# Patient Record
Sex: MALE | Race: WHITE | NOT HISPANIC OR LATINO | Employment: FULL TIME | URBAN - METROPOLITAN AREA
[De-identification: names, ages, dates, MRNs, and addresses within clinical notes are randomized per-mention and may not be internally consistent; named-entity substitution may affect disease eponyms.]

---

## 2017-09-09 ENCOUNTER — HOSPITAL ENCOUNTER (EMERGENCY)
Facility: HOSPITAL | Age: 32
Discharge: LEFT BEFORE DISCHARGE | End: 2017-09-09

## 2017-09-09 VITALS
BODY MASS INDEX: 23.03 KG/M2 | OXYGEN SATURATION: 99 % | RESPIRATION RATE: 18 BRPM | HEART RATE: 83 BPM | HEIGHT: 72 IN | TEMPERATURE: 97 F | SYSTOLIC BLOOD PRESSURE: 124 MMHG | DIASTOLIC BLOOD PRESSURE: 74 MMHG | WEIGHT: 170 LBS

## 2017-09-09 LAB
ALBUMIN SERPL-MCNC: 5 G/DL (ref 3.5–5.2)
ALBUMIN/GLOB SERPL: 1.9 G/DL
ALP SERPL-CCNC: 52 U/L (ref 39–117)
ALT SERPL W P-5'-P-CCNC: 91 U/L (ref 1–41)
ANION GAP SERPL CALCULATED.3IONS-SCNC: 12.7 MMOL/L
AST SERPL-CCNC: 114 U/L (ref 1–40)
BASOPHILS # BLD AUTO: 0.02 10*3/MM3 (ref 0–0.2)
BASOPHILS NFR BLD AUTO: 0.4 % (ref 0–1.5)
BILIRUB SERPL-MCNC: 0.4 MG/DL (ref 0.1–1.2)
BUN BLD-MCNC: 13 MG/DL (ref 6–20)
BUN/CREAT SERPL: 18.6 (ref 7–25)
CALCIUM SPEC-SCNC: 9.2 MG/DL (ref 8.6–10.5)
CHLORIDE SERPL-SCNC: 103 MMOL/L (ref 98–107)
CO2 SERPL-SCNC: 27.3 MMOL/L (ref 22–29)
CREAT BLD-MCNC: 0.7 MG/DL (ref 0.76–1.27)
DEPRECATED RDW RBC AUTO: 42.4 FL (ref 37–54)
EOSINOPHIL # BLD AUTO: 0.03 10*3/MM3 (ref 0–0.7)
EOSINOPHIL NFR BLD AUTO: 0.6 % (ref 0.3–6.2)
ERYTHROCYTE [DISTWIDTH] IN BLOOD BY AUTOMATED COUNT: 12.4 % (ref 11.5–14.5)
GFR SERPL CREATININE-BSD FRML MDRD: 130 ML/MIN/1.73
GLOBULIN UR ELPH-MCNC: 2.6 GM/DL
GLUCOSE BLD-MCNC: 86 MG/DL (ref 65–99)
HCT VFR BLD AUTO: 43.1 % (ref 40.4–52.2)
HGB BLD-MCNC: 14.8 G/DL (ref 13.7–17.6)
HOLD SPECIMEN: NORMAL
HOLD SPECIMEN: NORMAL
IMM GRANULOCYTES # BLD: 0 10*3/MM3 (ref 0–0.03)
IMM GRANULOCYTES NFR BLD: 0 % (ref 0–0.5)
LIPASE SERPL-CCNC: 35 U/L (ref 13–60)
LYMPHOCYTES # BLD AUTO: 1.32 10*3/MM3 (ref 0.9–4.8)
LYMPHOCYTES NFR BLD AUTO: 27.2 % (ref 19.6–45.3)
MCH RBC QN AUTO: 32.2 PG (ref 27–32.7)
MCHC RBC AUTO-ENTMCNC: 34.3 G/DL (ref 32.6–36.4)
MCV RBC AUTO: 93.7 FL (ref 79.8–96.2)
MONOCYTES # BLD AUTO: 0.29 10*3/MM3 (ref 0.2–1.2)
MONOCYTES NFR BLD AUTO: 6 % (ref 5–12)
NEUTROPHILS # BLD AUTO: 3.19 10*3/MM3 (ref 1.9–8.1)
NEUTROPHILS NFR BLD AUTO: 65.8 % (ref 42.7–76)
PLATELET # BLD AUTO: 286 10*3/MM3 (ref 140–500)
PMV BLD AUTO: 10.6 FL (ref 6–12)
POTASSIUM BLD-SCNC: 4.3 MMOL/L (ref 3.5–5.2)
PROT SERPL-MCNC: 7.6 G/DL (ref 6–8.5)
RBC # BLD AUTO: 4.6 10*6/MM3 (ref 4.6–6)
SODIUM BLD-SCNC: 143 MMOL/L (ref 136–145)
WBC NRBC COR # BLD: 4.85 10*3/MM3 (ref 4.5–10.7)
WHOLE BLOOD HOLD SPECIMEN: NORMAL
WHOLE BLOOD HOLD SPECIMEN: NORMAL

## 2017-09-09 PROCEDURE — 83690 ASSAY OF LIPASE: CPT

## 2017-09-09 PROCEDURE — 85025 COMPLETE CBC W/AUTO DIFF WBC: CPT

## 2017-09-09 PROCEDURE — 99211 OFF/OP EST MAY X REQ PHY/QHP: CPT

## 2017-09-09 PROCEDURE — 80053 COMPREHEN METABOLIC PANEL: CPT

## 2017-09-09 RX ORDER — SODIUM CHLORIDE 0.9 % (FLUSH) 0.9 %
10 SYRINGE (ML) INJECTION AS NEEDED
Status: DISCONTINUED | OUTPATIENT
Start: 2017-09-09 | End: 2017-09-09 | Stop reason: HOSPADM

## 2017-09-20 ENCOUNTER — OFFICE VISIT (OUTPATIENT)
Dept: GASTROENTEROLOGY | Facility: CLINIC | Age: 32
End: 2017-09-20

## 2017-09-20 VITALS
BODY MASS INDEX: 24.33 KG/M2 | SYSTOLIC BLOOD PRESSURE: 126 MMHG | WEIGHT: 179.6 LBS | HEIGHT: 72 IN | DIASTOLIC BLOOD PRESSURE: 70 MMHG

## 2017-09-20 DIAGNOSIS — R10.10 PAIN OF UPPER ABDOMEN: Primary | ICD-10-CM

## 2017-09-20 PROCEDURE — 99203 OFFICE O/P NEW LOW 30 MIN: CPT | Performed by: INTERNAL MEDICINE

## 2017-09-20 RX ORDER — OMEPRAZOLE 20 MG/1
20 CAPSULE, DELAYED RELEASE ORAL DAILY
Qty: 30 CAPSULE | Refills: 5 | Status: SHIPPED | OUTPATIENT
Start: 2017-09-20 | End: 2017-10-05

## 2017-09-20 NOTE — PROGRESS NOTES
PATIENT INFORMATION  Alfredo Connor       - 1985    CHIEF COMPLAINT  Chief Complaint   Patient presents with   • Abdominal Pain       HISTORY OF PRESENT ILLNESS  Abdominal Pain     30 yo developed upper abdominal pain, epigastric, ruq , sharp, knawing pain with some nausea but no vomiting. This occurred about 2 weeks ago.   He had labs done but did not stay for the visit due to the length of stay.  He has tried ppi in the past but this does not seem to help. He did not feel like this was heartburn related.  He drinks about 1 cup coffee daily, occ soda. No nsaid use.  Pain lasted for about 3 hours and went away on its own. Labs including lfts were done.    ALT (SGPT) 1 - 41 U/L 91 (H)   AST (SGOT) 1 - 40 U/L 114 (H)   Alkaline Phosphatase 39 - 117 U/L 52     He drinks etoh and had 8-10 beers prior to the above visit. He did not think this made pain worse. Lipase was normal.  No nausea, vomiting or melena. No hematemesis or hematochezia. Weight has been stable.  He denies any heartburn, dysphagia or odynophagia.  He has been having milder but similar episodes as above, for the past 2 years.   He is not able to pinpoint any foods or etoh to episodes.  He is on tofacitinib for alopecia for the past 2 months and is followed by dermatologist.  REVIEW OF SYSTEMS  Review of Systems   Gastrointestinal: Positive for abdominal pain.   All other systems reviewed and are negative.        ACTIVE PROBLEMS  There are no active problems to display for this patient.        PAST MEDICAL HISTORY  Past Medical History:   Diagnosis Date   • Alopecia          SURGICAL HISTORY  No past surgical history on file.      FAMILY HISTORY  No family history on file.      SOCIAL HISTORY  Social History     Occupational History   • Not on file.     Social History Main Topics   • Smoking status: Current Every Day Smoker     Types: Electronic Cigarette   • Smokeless tobacco: Never Used   • Alcohol use Yes   • Drug use: No   • Sexual activity:  "Defer         CURRENT MEDICATIONS    Current Outpatient Prescriptions:   •  omeprazole (priLOSEC) 20 MG capsule, Take 1 capsule by mouth Daily., Disp: 30 capsule, Rfl: 5  •  Tofacitinib Citrate (XELJANZ) 5 MG tablet, Take 5 mg by mouth 2 (Two) Times a Day., Disp: , Rfl:     ALLERGIES  Review of patient's allergies indicates no known allergies.    VITALS  Vitals:    09/20/17 1434   BP: 126/70   Weight: 179 lb 9.6 oz (81.5 kg)   Height: 72\" (182.9 cm)       LAST RESULTS   Admission on 09/09/2017, Discharged on 09/09/2017   Component Date Value Ref Range Status   • Glucose 09/09/2017 86  65 - 99 mg/dL Final   • BUN 09/09/2017 13  6 - 20 mg/dL Final   • Creatinine 09/09/2017 0.70* 0.76 - 1.27 mg/dL Final   • Sodium 09/09/2017 143  136 - 145 mmol/L Final   • Potassium 09/09/2017 4.3  3.5 - 5.2 mmol/L Final   • Chloride 09/09/2017 103  98 - 107 mmol/L Final   • CO2 09/09/2017 27.3  22.0 - 29.0 mmol/L Final   • Calcium 09/09/2017 9.2  8.6 - 10.5 mg/dL Final   • Total Protein 09/09/2017 7.6  6.0 - 8.5 g/dL Final   • Albumin 09/09/2017 5.00  3.50 - 5.20 g/dL Final   • ALT (SGPT) 09/09/2017 91* 1 - 41 U/L Final   • AST (SGOT) 09/09/2017 114* 1 - 40 U/L Final   • Alkaline Phosphatase 09/09/2017 52  39 - 117 U/L Final   • Total Bilirubin 09/09/2017 0.4  0.1 - 1.2 mg/dL Final   • eGFR Non African Amer 09/09/2017 130  >60 mL/min/1.73 Final   • Globulin 09/09/2017 2.6  gm/dL Final   • A/G Ratio 09/09/2017 1.9  g/dL Final   • BUN/Creatinine Ratio 09/09/2017 18.6  7.0 - 25.0 Final   • Anion Gap 09/09/2017 12.7  mmol/L Final   • Lipase 09/09/2017 35  13 - 60 U/L Final   • Extra Tube 09/09/2017 hold for add-on   Final    Auto resulted   • Extra Tube 09/09/2017 Hold for add-ons.   Final    Auto resulted.   • Extra Tube 09/09/2017 hold for add-on   Final    Auto resulted   • Extra Tube 09/09/2017 Hold for add-ons.   Final    Auto resulted.   • WBC 09/09/2017 4.85  4.50 - 10.70 10*3/mm3 Final   • RBC 09/09/2017 4.60  4.60 - 6.00 10*6/mm3 " Final   • Hemoglobin 09/09/2017 14.8  13.7 - 17.6 g/dL Final   • Hematocrit 09/09/2017 43.1  40.4 - 52.2 % Final   • MCV 09/09/2017 93.7  79.8 - 96.2 fL Final   • MCH 09/09/2017 32.2  27.0 - 32.7 pg Final   • MCHC 09/09/2017 34.3  32.6 - 36.4 g/dL Final   • RDW 09/09/2017 12.4  11.5 - 14.5 % Final   • RDW-SD 09/09/2017 42.4  37.0 - 54.0 fl Final   • MPV 09/09/2017 10.6  6.0 - 12.0 fL Final   • Platelets 09/09/2017 286  140 - 500 10*3/mm3 Final   • Neutrophil % 09/09/2017 65.8  42.7 - 76.0 % Final   • Lymphocyte % 09/09/2017 27.2  19.6 - 45.3 % Final   • Monocyte % 09/09/2017 6.0  5.0 - 12.0 % Final   • Eosinophil % 09/09/2017 0.6  0.3 - 6.2 % Final   • Basophil % 09/09/2017 0.4  0.0 - 1.5 % Final   • Immature Grans % 09/09/2017 0.0  0.0 - 0.5 % Final   • Neutrophils, Absolute 09/09/2017 3.19  1.90 - 8.10 10*3/mm3 Final   • Lymphocytes, Absolute 09/09/2017 1.32  0.90 - 4.80 10*3/mm3 Final   • Monocytes, Absolute 09/09/2017 0.29  0.20 - 1.20 10*3/mm3 Final   • Eosinophils, Absolute 09/09/2017 0.03  0.00 - 0.70 10*3/mm3 Final   • Basophils, Absolute 09/09/2017 0.02  0.00 - 0.20 10*3/mm3 Final   • Immature Grans, Absolute 09/09/2017 0.00  0.00 - 0.03 10*3/mm3 Final     No results found.    PHYSICAL EXAM  Physical Exam   Constitutional: He is oriented to person, place, and time. He appears well-developed and well-nourished. No distress.   HENT:   Head: Normocephalic and atraumatic.   Eyes: EOM are normal. Pupils are equal, round, and reactive to light.   Neck: Neck supple. No tracheal deviation present.   Cardiovascular: Normal rate, regular rhythm, normal heart sounds and intact distal pulses.  Exam reveals no gallop and no friction rub.    No murmur heard.  Pulmonary/Chest: Effort normal and breath sounds normal. No respiratory distress. He has no wheezes. He has no rales. He exhibits no tenderness.   Abdominal: Soft. Bowel sounds are normal. He exhibits no distension. There is no tenderness. There is no rebound and no  guarding.   Musculoskeletal: He exhibits no edema.   Lymphadenopathy:     He has no cervical adenopathy.   Neurological: He is alert and oriented to person, place, and time.   Skin: Skin is warm. He is not diaphoretic. No erythema.   Psychiatric: He has a normal mood and affect. His behavior is normal. Judgment and thought content normal.   Nursing note and vitals reviewed.      ASSESSMENT  Diagnoses and all orders for this visit:    Pain of upper abdomen  -     Hepatic Function Panel  -     Hepatitis Panel, Acute  -     US Gallbladder; Future    Other orders  -     omeprazole (priLOSEC) 20 MG capsule; Take 1 capsule by mouth Daily.          PLAN  No Follow-up on file.    Antireflux measures and dietary modifications reviewed. Low acid diet reviewed. Keep head of bed elevated. Stop eating/drinking at least 3 hours prior to bedtime. Eliminate caffeine and carbonated beverages.  Weight loss encouraged if BMI over 25.        Risks, benefits and alternatives discussed including but not limited to the complications of bleeding, perforation and sedation related problems.

## 2017-09-25 ENCOUNTER — APPOINTMENT (OUTPATIENT)
Dept: LAB | Facility: HOSPITAL | Age: 32
End: 2017-09-25

## 2017-09-25 ENCOUNTER — HOSPITAL ENCOUNTER (OUTPATIENT)
Dept: ULTRASOUND IMAGING | Facility: HOSPITAL | Age: 32
Discharge: HOME | End: 2017-09-25
Attending: INTERNAL MEDICINE | Admitting: INTERNAL MEDICINE

## 2017-09-25 DIAGNOSIS — R10.10 PAIN OF UPPER ABDOMEN: ICD-10-CM

## 2017-09-25 DIAGNOSIS — K86.89 PANCREATIC MASS: Primary | ICD-10-CM

## 2017-09-25 LAB
ALBUMIN SERPL-MCNC: 4.6 G/DL (ref 3.5–5.2)
ALP SERPL-CCNC: 44 U/L (ref 39–117)
ALT SERPL W P-5'-P-CCNC: 20 U/L (ref 1–41)
AST SERPL-CCNC: 14 U/L (ref 1–40)
BILIRUB CONJ SERPL-MCNC: <0.2 MG/DL (ref 0–0.3)
BILIRUB INDIRECT SERPL-MCNC: NORMAL MG/DL
BILIRUB SERPL-MCNC: 0.5 MG/DL (ref 0.1–1.2)
HAV IGM SERPL QL IA: NORMAL
HBV CORE IGM SERPL QL IA: NORMAL
HBV SURFACE AG SERPL QL IA: NORMAL
HCV AB SER DONR QL: NORMAL
PROT SERPL-MCNC: 6.4 G/DL (ref 6–8.5)

## 2017-09-25 PROCEDURE — 76705 ECHO EXAM OF ABDOMEN: CPT

## 2017-09-25 PROCEDURE — 80076 HEPATIC FUNCTION PANEL: CPT | Performed by: INTERNAL MEDICINE

## 2017-09-25 PROCEDURE — 80074 ACUTE HEPATITIS PANEL: CPT | Performed by: INTERNAL MEDICINE

## 2017-09-25 PROCEDURE — 36415 COLL VENOUS BLD VENIPUNCTURE: CPT | Performed by: INTERNAL MEDICINE

## 2017-09-29 ENCOUNTER — HOSPITAL ENCOUNTER (OUTPATIENT)
Dept: CT IMAGING | Facility: HOSPITAL | Age: 32
Discharge: HOME | End: 2017-09-29
Attending: INTERNAL MEDICINE | Admitting: INTERNAL MEDICINE

## 2017-09-29 DIAGNOSIS — K86.89 PANCREATIC MASS: ICD-10-CM

## 2017-09-29 PROCEDURE — 0 IOPAMIDOL 61 % SOLUTION: Performed by: INTERNAL MEDICINE

## 2017-09-29 PROCEDURE — 74177 CT ABD & PELVIS W/CONTRAST: CPT

## 2017-09-29 PROCEDURE — 0 DIATRIZOATE MEGLUMINE & SODIUM PER 1 ML: Performed by: INTERNAL MEDICINE

## 2017-09-29 RX ADMIN — IOPAMIDOL 85 ML: 612 INJECTION, SOLUTION INTRAVENOUS at 10:48

## 2017-09-29 RX ADMIN — DIATRIZOATE MEGLUMINE AND DIATRIZOATE SODIUM 30 ML: 660; 100 LIQUID ORAL; RECTAL at 09:45

## 2017-10-05 ENCOUNTER — OFFICE VISIT (OUTPATIENT)
Dept: FAMILY MEDICINE CLINIC | Facility: CLINIC | Age: 32
End: 2017-10-05

## 2017-10-05 VITALS
OXYGEN SATURATION: 99 % | HEIGHT: 72 IN | TEMPERATURE: 98.3 F | DIASTOLIC BLOOD PRESSURE: 68 MMHG | BODY MASS INDEX: 24.52 KG/M2 | SYSTOLIC BLOOD PRESSURE: 120 MMHG | WEIGHT: 181 LBS | HEART RATE: 67 BPM

## 2017-10-05 DIAGNOSIS — R10.13 EPIGASTRIC PAIN: Primary | ICD-10-CM

## 2017-10-05 PROCEDURE — 99203 OFFICE O/P NEW LOW 30 MIN: CPT | Performed by: INTERNAL MEDICINE

## 2017-10-05 NOTE — PROGRESS NOTES
Usman Connor is a 31 y.o. male. Patient is here today for establishing care as a new patient and because of some epigastric pain.  The patient reports that he was at the emergency room on September 19 because of severe epigastric abdominal pain.  He said he has had this about once every 6 months.  Since then he was referred to a gastroenterologist, Dr. Soila Price.  He has had abdominal ultrasound, CT scan and is scheduled for EGD.  He does have a prescription for omeprazole to use if needed.  Currently he is pain-free and generally is quite healthy.  PMH-he is being treated by Dr. Wilkins, dermatologist for alopecia with Xeljanz.  He has had Lasix surgery and a tonsillectomy.  SH-patient's  and he is a former smoker and does have about 10 alcoholic drinks a month.    Chief Complaint   Patient presents with   • Abdominal Pain     PT WENT TO ER IN SEPT FOR ABDOMINAL PAIN- SAW GASTRO AND IS HERE TO ESTABLISH          Vitals:    10/05/17 1309   BP: 120/68   Pulse: 67   Temp: 98.3 °F (36.8 °C)   SpO2: 99%     The following portions of the patient's history were reviewed and updated as appropriate: allergies, current medications, past family history, past medical history, past social history, past surgical history and problem list.    Past Medical History:   Diagnosis Date   • Alopecia       Allergies   Allergen Reactions   • Azithromycin      MADE SICK AS A KID   • Ceclor [Cefaclor]      MADE SICK AS A KID      Social History     Social History   • Marital status:      Spouse name: N/A   • Number of children: N/A   • Years of education: N/A     Occupational History   • Not on file.     Social History Main Topics   • Smoking status: Former Smoker     Types: Electronic Cigarette   • Smokeless tobacco: Never Used   • Alcohol use Yes   • Drug use: No   • Sexual activity: Defer     Other Topics Concern   • Not on file     Social History Narrative   • No narrative on file        Current Outpatient  Prescriptions:   •  Tofacitinib Citrate (XELJANZ) 5 MG tablet, Take 5 mg by mouth 2 (Two) Times a Day., Disp: , Rfl:      Objective     History of Present Illness     Review of Systems   Constitutional: Negative.  Negative for fever.   HENT: Negative.    Eyes: Negative.    Respiratory: Negative.    Cardiovascular: Negative.    Gastrointestinal: Positive for abdominal pain. Negative for constipation, diarrhea, nausea and vomiting.   Endocrine: Negative.    Genitourinary: Negative.  Negative for dysuria, frequency and hematuria.   Musculoskeletal: Negative.  Negative for arthralgias and myalgias.   Allergic/Immunologic: Negative.    Neurological: Negative.  Negative for headaches.   Psychiatric/Behavioral: Negative.        Physical Exam   Constitutional: He is oriented to person, place, and time. He appears well-developed and well-nourished.   Pleasant, cooperative in no distress   HENT:   Head: Normocephalic and atraumatic.   Eyes: Conjunctivae are normal. Pupils are equal, round, and reactive to light. No scleral icterus.   Neck: Normal range of motion. Neck supple. No thyromegaly present.   Cardiovascular: Normal rate, regular rhythm and normal heart sounds.    Pulmonary/Chest: Effort normal and breath sounds normal. No respiratory distress. He has no wheezes. He has no rales.   Abdominal: Soft. Bowel sounds are normal. There is no tenderness.   Musculoskeletal: Normal range of motion. He exhibits no edema.   Neurological: He is alert and oriented to person, place, and time.   Skin: Skin is warm and dry.   Psychiatric: He has a normal mood and affect. His behavior is normal.   Nursing note and vitals reviewed.      ASSESSMENT  the patient had elevated ALT and AST of 91 and 114 at the emergency room with a normal lipase on 9-9, 2017.  Subsequently on 9-25, 2017 he had a normal hepatic function panel and a negative acute hepatitis panel.  Ultrasound of the gallbladder showed no signs of gallbladder problems but there  was a questionable pancreatic lesion and he had a subsequent CT scan on 9-25, 2017 which showed a normal pancreas and was essentially normal.  He is scheduled for upper endoscopy.  He's having no further abdominal pain.     Problem List Items Addressed This Visit        Nervous and Auditory    Epigastric pain - Primary          PLAN  The patient will schedule an appointment in about one month with a CMP and lipid panel    There are no Patient Instructions on file for this visit.  Return in about 1 month (around 11/5/2017) for with labs.

## 2017-11-06 DIAGNOSIS — Z13.220 SCREENING FOR CHOLESTEROL LEVEL: Primary | ICD-10-CM

## 2017-11-14 ENCOUNTER — LAB REQUISITION (OUTPATIENT)
Dept: LAB | Facility: HOSPITAL | Age: 32
End: 2017-11-14

## 2017-11-14 ENCOUNTER — OUTSIDE FACILITY SERVICE (OUTPATIENT)
Dept: GASTROENTEROLOGY | Facility: CLINIC | Age: 32
End: 2017-11-14

## 2017-11-14 DIAGNOSIS — R10.10 UPPER ABDOMINAL PAIN: ICD-10-CM

## 2017-11-14 LAB
ALBUMIN SERPL-MCNC: 4.7 G/DL (ref 3.5–5.2)
ALBUMIN/GLOB SERPL: 2.6 G/DL
ALP SERPL-CCNC: 46 U/L (ref 39–117)
ALT SERPL-CCNC: 24 U/L (ref 1–41)
AST SERPL-CCNC: 16 U/L (ref 1–40)
BILIRUB SERPL-MCNC: 0.7 MG/DL (ref 0.1–1.2)
BUN SERPL-MCNC: 12 MG/DL (ref 6–20)
BUN/CREAT SERPL: 14 (ref 7–25)
CALCIUM SERPL-MCNC: 9.6 MG/DL (ref 8.6–10.5)
CHLORIDE SERPL-SCNC: 104 MMOL/L (ref 98–107)
CHOLEST SERPL-MCNC: 197 MG/DL (ref 0–200)
CO2 SERPL-SCNC: 27.2 MMOL/L (ref 22–29)
CREAT SERPL-MCNC: 0.86 MG/DL (ref 0.76–1.27)
GFR SERPLBLD CREATININE-BSD FMLA CKD-EPI: 102 ML/MIN/1.73
GFR SERPLBLD CREATININE-BSD FMLA CKD-EPI: 123 ML/MIN/1.73
GLOBULIN SER CALC-MCNC: 1.8 GM/DL
GLUCOSE SERPL-MCNC: 93 MG/DL (ref 65–99)
HDLC SERPL-MCNC: 56 MG/DL (ref 40–60)
LDLC SERPL CALC-MCNC: 130 MG/DL (ref 0–100)
LDLC/HDLC SERPL: 2.32 {RATIO}
POTASSIUM SERPL-SCNC: 4.7 MMOL/L (ref 3.5–5.2)
PROT SERPL-MCNC: 6.5 G/DL (ref 6–8.5)
SODIUM SERPL-SCNC: 142 MMOL/L (ref 136–145)
TRIGL SERPL-MCNC: 56 MG/DL (ref 0–150)
VLDLC SERPL CALC-MCNC: 11.2 MG/DL (ref 5–40)

## 2017-11-14 PROCEDURE — 88312 SPECIAL STAINS GROUP 1: CPT | Performed by: INTERNAL MEDICINE

## 2017-11-14 PROCEDURE — 88305 TISSUE EXAM BY PATHOLOGIST: CPT | Performed by: INTERNAL MEDICINE

## 2017-11-14 PROCEDURE — 43239 EGD BIOPSY SINGLE/MULTIPLE: CPT | Performed by: INTERNAL MEDICINE

## 2017-11-16 LAB
CYTO UR: NORMAL
LAB AP CASE REPORT: NORMAL
LAB AP CLINICAL INFORMATION: NORMAL
Lab: NORMAL
PATH REPORT.FINAL DX SPEC: NORMAL
PATH REPORT.GROSS SPEC: NORMAL

## 2017-11-21 PROBLEM — K21.00 GASTROESOPHAGEAL REFLUX DISEASE WITH ESOPHAGITIS: Status: ACTIVE | Noted: 2017-11-21

## 2017-11-21 RX ORDER — OMEPRAZOLE 20 MG/1
20 CAPSULE, DELAYED RELEASE ORAL DAILY
Qty: 90 CAPSULE | Refills: 3 | Status: SHIPPED | OUTPATIENT
Start: 2017-11-21

## 2017-12-01 ENCOUNTER — OFFICE VISIT (OUTPATIENT)
Dept: FAMILY MEDICINE CLINIC | Facility: CLINIC | Age: 32
End: 2017-12-01

## 2017-12-01 VITALS
HEIGHT: 72 IN | TEMPERATURE: 98.3 F | OXYGEN SATURATION: 98 % | DIASTOLIC BLOOD PRESSURE: 74 MMHG | HEART RATE: 61 BPM | SYSTOLIC BLOOD PRESSURE: 114 MMHG | BODY MASS INDEX: 25.03 KG/M2 | WEIGHT: 184.8 LBS

## 2017-12-01 DIAGNOSIS — E78.5 HYPERLIPIDEMIA, UNSPECIFIED HYPERLIPIDEMIA TYPE: ICD-10-CM

## 2017-12-01 DIAGNOSIS — R10.13 EPIGASTRIC PAIN: Primary | ICD-10-CM

## 2017-12-01 PROCEDURE — 99213 OFFICE O/P EST LOW 20 MIN: CPT | Performed by: INTERNAL MEDICINE

## 2017-12-01 NOTE — PROGRESS NOTES
Usman Connor is a 32 y.o. male. Patient is here today for follow-up on epigastric pain and some laboratory results.  Since I saw him last he did see Dr. Alexandra and had upper endoscopy which showed some esophagitis and gastritis.  She  prescribed omeprazole 20 mg which he actually has not picked up and got started on.  Patient tells me that he is really had no further symptoms whatsoever.  He wonders if a lot of alcohol on a fairly empty stomach could've caused some of his symptoms.  Otherwise he is felt well and is had no epigastric or chest pains at all and no digestive symptoms    Chief Complaint   Patient presents with   • GERD     FOLLOW UP LABS          Vitals:    12/01/17 1552   BP: 114/74   Pulse: 61   Temp: 98.3 °F (36.8 °C)   SpO2: 98%     The following portions of the patient's history were reviewed and updated as appropriate: allergies, current medications, past family history, past medical history, past social history, past surgical history and problem list.    Past Medical History:   Diagnosis Date   • Alopecia    • GERD (gastroesophageal reflux disease)       Allergies   Allergen Reactions   • Azithromycin      MADE SICK AS A KID   • Ceclor [Cefaclor]      MADE SICK AS A KID      Social History     Social History   • Marital status:      Spouse name: N/A   • Number of children: N/A   • Years of education: N/A     Occupational History   • Not on file.     Social History Main Topics   • Smoking status: Former Smoker     Types: Electronic Cigarette   • Smokeless tobacco: Never Used   • Alcohol use Yes   • Drug use: No   • Sexual activity: Defer     Other Topics Concern   • Not on file     Social History Narrative        Current Outpatient Prescriptions:   •  omeprazole (priLOSEC) 20 MG capsule, Take 1 capsule by mouth Daily., Disp: 90 capsule, Rfl: 3  •  Tofacitinib Citrate (XELJANZ) 5 MG tablet, Take 5 mg by mouth 2 (Two) Times a Day., Disp: , Rfl:      Objective     History of Present  Illness     Review of Systems   Constitutional: Negative.    HENT: Negative.    Eyes: Negative.    Respiratory: Negative.    Cardiovascular: Negative.    Gastrointestinal: Negative.    Endocrine: Negative.    Genitourinary: Negative.    Musculoskeletal: Negative.    Neurological: Negative.    Psychiatric/Behavioral: Negative.        Physical Exam   Constitutional: He is oriented to person, place, and time. He appears well-developed and well-nourished.   Pleasant, cooperative no distress with a blood pressure 110/70   HENT:   Head: Normocephalic and atraumatic.   Eyes: Conjunctivae are normal. Pupils are equal, round, and reactive to light. No scleral icterus.   Neck: Normal range of motion. Neck supple.   Cardiovascular: Normal rate, regular rhythm and normal heart sounds.    Pulmonary/Chest: Effort normal and breath sounds normal. No respiratory distress. He has no wheezes. He has no rales.   Abdominal: Soft. Bowel sounds are normal.   Musculoskeletal: Normal range of motion.   Neurological: He is alert and oriented to person, place, and time.   Skin: Skin is warm and dry.   Psychiatric: He has a normal mood and affect. His behavior is normal.   Nursing note and vitals reviewed.      ASSESSMENT  CMP is completely normal.  His lipid panel was normal aside from an LDL of 130.  The patient's having no GI symptoms whatsoever  #1-esophagitis on endoscopy but no symptoms  #2-mild hyperlipidemia     Problem List Items Addressed This Visit        Cardiovascular and Mediastinum    Hyperlipidemia       Nervous and Auditory    Epigastric pain - Primary          PLAN  The patient plans on not taking the omeprazole.  He will try and watch dietary fats a little bit.  I'd like to recheck him in 6 months with a CMP and lipid panel    There are no Patient Instructions on file for this visit.  Return in about 6 months (around 6/1/2018) for with labs.

## 2018-07-11 DIAGNOSIS — E78.5 HYPERLIPIDEMIA, UNSPECIFIED HYPERLIPIDEMIA TYPE: Primary | ICD-10-CM

## 2021-04-16 ENCOUNTER — BULK ORDERING (OUTPATIENT)
Dept: CASE MANAGEMENT | Facility: OTHER | Age: 36
End: 2021-04-16

## 2021-04-16 DIAGNOSIS — Z23 IMMUNIZATION DUE: ICD-10-CM

## 2022-12-09 ENCOUNTER — LAB (OUTPATIENT)
Dept: LAB | Facility: HOSPITAL | Age: 37
End: 2022-12-09

## 2023-06-26 ENCOUNTER — TELEPHONE (OUTPATIENT)
Dept: ORTHOPEDIC SURGERY | Facility: CLINIC | Age: 38
End: 2023-06-26

## 2023-06-26 NOTE — TELEPHONE ENCOUNTER
[facility] staff attempted to follow warm transfer process and was unsuccessful     Caller: OSWALDO RINALDI    Relationship to patient: SELF    Best call back number: [phone]    Patient is needing: PT RETURNING CALL ABOUT EARLIER APPT ON 6/27; HE SAID HE CANNOT COME ANY EARLIER THAN HIS CURRENTLY SCHEDULED TIME BUT IF THE APPT NEEDS TO BE RESCHEDULED HE IS FINE WITH DOING SO. [facility] ATTEMPTED TO WARM TRANSFER TO SEE IF THIS IS WAS THE CASE, BUT WAS UNSUCCESSFUL. IF PT NEEDS TO BE RESCHEDULED TO A DIFFERENT DATE/TIME PLEASE CALL HIM BACK AT THE NUMBER ABOVE.